# Patient Record
Sex: MALE | Race: OTHER | Employment: UNEMPLOYED | URBAN - METROPOLITAN AREA
[De-identification: names, ages, dates, MRNs, and addresses within clinical notes are randomized per-mention and may not be internally consistent; named-entity substitution may affect disease eponyms.]

---

## 2017-11-10 ENCOUNTER — HOSPITAL ENCOUNTER (EMERGENCY)
Age: 37
Discharge: HOME | End: 2017-11-11
Payer: SELF-PAY

## 2017-11-10 VITALS
RESPIRATION RATE: 18 BRPM | HEART RATE: 76 BPM | DIASTOLIC BLOOD PRESSURE: 86 MMHG | TEMPERATURE: 98.8 F | SYSTOLIC BLOOD PRESSURE: 130 MMHG | OXYGEN SATURATION: 97 %

## 2017-11-10 DIAGNOSIS — F29 PSYCHOSIS, UNSPECIFIED PSYCHOSIS TYPE (HCC): Primary | ICD-10-CM

## 2017-11-10 LAB
ALBUMIN SERPL-MCNC: 4.2 G/DL (ref 3.5–5)
ALBUMIN/GLOB SERPL: 0.9 {RATIO} (ref 1.2–3.5)
ALP SERPL-CCNC: 70 U/L (ref 50–136)
ALT SERPL-CCNC: 70 U/L (ref 12–65)
AMPHET UR QL SCN: POSITIVE
ANION GAP SERPL CALC-SCNC: 7 MMOL/L (ref 7–16)
APPEARANCE UR: NORMAL
AST SERPL-CCNC: 46 U/L (ref 15–37)
ATRIAL RATE: 71 BPM
BARBITURATES UR QL SCN: NEGATIVE
BASOPHILS # BLD: 0 K/UL (ref 0–0.2)
BASOPHILS NFR BLD: 1 % (ref 0–2)
BENZODIAZ UR QL: NEGATIVE
BILIRUB SERPL-MCNC: 0.7 MG/DL (ref 0.2–1.1)
BILIRUB UR QL: NEGATIVE
BUN SERPL-MCNC: 10 MG/DL (ref 6–23)
CALCIUM SERPL-MCNC: 9.2 MG/DL (ref 8.3–10.4)
CALCULATED P AXIS, ECG09: 50 DEGREES
CALCULATED R AXIS, ECG10: 78 DEGREES
CALCULATED T AXIS, ECG11: 63 DEGREES
CANNABINOIDS UR QL SCN: NEGATIVE
CHLORIDE SERPL-SCNC: 101 MMOL/L (ref 98–107)
CO2 SERPL-SCNC: 27 MMOL/L (ref 21–32)
COCAINE UR QL SCN: NEGATIVE
COLOR UR: YELLOW
CREAT SERPL-MCNC: 1.06 MG/DL (ref 0.8–1.5)
DIAGNOSIS, 93000: NORMAL
DIFFERENTIAL METHOD BLD: ABNORMAL
EOSINOPHIL # BLD: 0.3 K/UL (ref 0–0.8)
EOSINOPHIL NFR BLD: 4 % (ref 0.5–7.8)
ERYTHROCYTE [DISTWIDTH] IN BLOOD BY AUTOMATED COUNT: 14.5 % (ref 11.9–14.6)
ETHANOL SERPL-MCNC: <3 MG/DL
GLOBULIN SER CALC-MCNC: 4.6 G/DL (ref 2.3–3.5)
GLUCOSE SERPL-MCNC: 113 MG/DL (ref 65–100)
GLUCOSE UR STRIP.AUTO-MCNC: NEGATIVE MG/DL
HCT VFR BLD AUTO: 43.4 % (ref 41.1–50.3)
HGB BLD-MCNC: 15.7 G/DL (ref 13.6–17.2)
HGB UR QL STRIP: NEGATIVE
IMM GRANULOCYTES # BLD: 0 K/UL (ref 0–0.5)
IMM GRANULOCYTES NFR BLD AUTO: 0 % (ref 0–5)
KETONES UR QL STRIP.AUTO: NEGATIVE MG/DL
LEUKOCYTE ESTERASE UR QL STRIP.AUTO: NEGATIVE
LYMPHOCYTES # BLD: 1.1 K/UL (ref 0.5–4.6)
LYMPHOCYTES NFR BLD: 14 % (ref 13–44)
MAGNESIUM SERPL-MCNC: 1.9 MG/DL (ref 1.8–2.4)
MCH RBC QN AUTO: 31 PG (ref 26.1–32.9)
MCHC RBC AUTO-ENTMCNC: 36.2 G/DL (ref 31.4–35)
MCV RBC AUTO: 85.6 FL (ref 79.6–97.8)
METHADONE UR QL: NEGATIVE
MONOCYTES # BLD: 0.5 K/UL (ref 0.1–1.3)
MONOCYTES NFR BLD: 7 % (ref 4–12)
NEUTS SEG # BLD: 5.6 K/UL (ref 1.7–8.2)
NEUTS SEG NFR BLD: 74 % (ref 43–78)
NITRITE UR QL STRIP.AUTO: NEGATIVE
OPIATES UR QL: NEGATIVE
P-R INTERVAL, ECG05: 164 MS
PCP UR QL: NEGATIVE
PH UR STRIP: 5.5 [PH] (ref 5–9)
PLATELET # BLD AUTO: 217 K/UL (ref 150–450)
PMV BLD AUTO: 11.9 FL (ref 10.8–14.1)
POTASSIUM SERPL-SCNC: 4.1 MMOL/L (ref 3.5–5.1)
PROT SERPL-MCNC: 8.8 G/DL (ref 6.3–8.2)
PROT UR STRIP-MCNC: NEGATIVE MG/DL
Q-T INTERVAL, ECG07: 392 MS
QRS DURATION, ECG06: 92 MS
QTC CALCULATION (BEZET), ECG08: 425 MS
RBC # BLD AUTO: 5.07 M/UL (ref 4.23–5.67)
SODIUM SERPL-SCNC: 135 MMOL/L (ref 136–145)
SP GR UR REFRACTOMETRY: 1.02 (ref 1–1.02)
UROBILINOGEN UR QL STRIP.AUTO: 0.2 EU/DL (ref 0.2–1)
VENTRICULAR RATE, ECG03: 71 BPM
WBC # BLD AUTO: 7.6 K/UL (ref 4.3–11.1)

## 2017-11-10 PROCEDURE — 81003 URINALYSIS AUTO W/O SCOPE: CPT | Performed by: EMERGENCY MEDICINE

## 2017-11-10 PROCEDURE — 93005 ELECTROCARDIOGRAM TRACING: CPT

## 2017-11-10 PROCEDURE — 83735 ASSAY OF MAGNESIUM: CPT

## 2017-11-10 PROCEDURE — 74011250637 HC RX REV CODE- 250/637: Performed by: EMERGENCY MEDICINE

## 2017-11-10 PROCEDURE — 80053 COMPREHEN METABOLIC PANEL: CPT

## 2017-11-10 PROCEDURE — 80307 DRUG TEST PRSMV CHEM ANLYZR: CPT

## 2017-11-10 PROCEDURE — 99285 EMERGENCY DEPT VISIT HI MDM: CPT | Performed by: EMERGENCY MEDICINE

## 2017-11-10 PROCEDURE — 85025 COMPLETE CBC W/AUTO DIFF WBC: CPT

## 2017-11-10 PROCEDURE — 96374 THER/PROPH/DIAG INJ IV PUSH: CPT | Performed by: EMERGENCY MEDICINE

## 2017-11-10 PROCEDURE — 74011250636 HC RX REV CODE- 250/636

## 2017-11-10 RX ORDER — OLANZAPINE 5 MG/1
10 TABLET ORAL
Status: COMPLETED | OUTPATIENT
Start: 2017-11-10 | End: 2017-11-10

## 2017-11-10 RX ORDER — LORAZEPAM 2 MG/ML
0.5 INJECTION INTRAMUSCULAR
Status: COMPLETED | OUTPATIENT
Start: 2017-11-10 | End: 2017-11-10

## 2017-11-10 RX ORDER — OLANZAPINE 5 MG/1
10 TABLET, ORALLY DISINTEGRATING ORAL
Status: DISCONTINUED | OUTPATIENT
Start: 2017-11-10 | End: 2017-11-10 | Stop reason: RX

## 2017-11-10 RX ORDER — OLANZAPINE 5 MG/1
5 TABLET, ORALLY DISINTEGRATING ORAL 2 TIMES DAILY
Status: DISCONTINUED | OUTPATIENT
Start: 2017-11-10 | End: 2017-11-10 | Stop reason: RX

## 2017-11-10 RX ADMIN — OLANZAPINE 10 MG: 5 TABLET, FILM COATED ORAL at 11:45

## 2017-11-10 RX ADMIN — LORAZEPAM 0.5 MG: 2 INJECTION, SOLUTION INTRAMUSCULAR; INTRAVENOUS at 06:46

## 2017-11-10 NOTE — ED NOTES
PT received meal tray. Pt sleeping, respirations even and unlabored. No distress noted at this time. Sitter at bedside.

## 2017-11-10 NOTE — PROGRESS NOTES
Telepsych recommending inpatient admission. Call to [facility] and spoke with Christine Siddiqi. Patient added to waiting list which she states will most likely take a couple of weeks before a bed opens up. Bed requested and clinicals [facility] [address] and [facility].

## 2017-11-10 NOTE — ED NOTES
Patient provided with paper scrubs and requested to change. Patient provided with patient belongings bag at this time.

## 2017-11-10 NOTE — PROGRESS NOTES
Call from [place]na at [facility] who states they 'don't have an accepting doctor'. Call from Mike at [facility] who states they don't have any beds.

## 2017-11-10 NOTE — ED PROVIDER NOTES
HPI Comments: 64-year-old Japanese-speaking male presents to the ED via EMS with multiple complaints. EMS was dispatched for a psychiatric patient. Police were on scene and had been called to this patient on several occasions for psychiatric issues. Patient states that his heart was beating fast awoke him from sleep. Patient complained of headache to EMS and abdominal pain to persons on scene. He denies headache and abdominal pain now. Patient denies alcohol use today and does not use drugs. He does smoke cigarettes. Patient is a 40 y.o. male presenting with anxiety. The history is provided by the patient. Anxiety    This is a recurrent problem. The current episode started 3 to 5 hours ago. The problem has been resolved. The problem occurs constantly. The pain is associated with rest. The quality of the pain is described as tightness. The pain radiates to the upper back and right neck. The symptoms are aggravated by movement. Associated symptoms include headaches and palpitations. He has tried nothing for the symptoms. Risk factors include smoking/tobacco exposure. No past medical history on file. No past surgical history on file. No family history on file. Social History     Social History    Marital status: SINGLE     Spouse name: N/A    Number of children: N/A    Years of education: N/A     Occupational History    Not on file. Social History Main Topics    Smoking status: Not on file    Smokeless tobacco: Not on file    Alcohol use Not on file    Drug use: Not on file    Sexual activity: Not on file     Other Topics Concern    Not on file     Social History Narrative         ALLERGIES: Review of patient's allergies indicates no known allergies. Review of Systems   Constitutional: Negative. Negative for activity change. HENT: Negative. Eyes: Negative. Respiratory: Negative. Cardiovascular: Positive for palpitations. Gastrointestinal: Negative. Genitourinary: Negative. Musculoskeletal: Negative. Skin: Negative. Neurological: Positive for headaches. Psychiatric/Behavioral: Negative. All other systems reviewed and are negative. Vitals:    11/10/17 0600   BP: (!) 173/95   Pulse: 78   Resp: 20   Temp: 97 °F (36.1 °C)   SpO2: 98%            Physical Exam   Constitutional: He is oriented to person, place, and time. He appears well-developed and well-nourished. No distress. HENT:   Head: Normocephalic and atraumatic. Right Ear: External ear normal.   Left Ear: External ear normal.   Nose: Nose normal.   Mouth/Throat: Oropharynx is clear and moist. No oropharyngeal exudate. Eyes: Conjunctivae and EOM are normal. Pupils are equal, round, and reactive to light. Right eye exhibits no discharge. Left eye exhibits no discharge. No scleral icterus. Neck: Normal range of motion. Neck supple. No JVD present. No tracheal deviation present. Cardiovascular: Normal rate, regular rhythm and intact distal pulses. Pulmonary/Chest: Effort normal and breath sounds normal. No stridor. No respiratory distress. He has no wheezes. He exhibits no tenderness. Abdominal: Soft. Bowel sounds are normal. He exhibits no distension and no mass. There is no tenderness. Musculoskeletal: Normal range of motion. He exhibits no edema or tenderness. Cervical back: He exhibits pain. Back:    Neurological: He is alert and oriented to person, place, and time. No cranial nerve deficit. Skin: Skin is warm and dry. No rash noted. He is not diaphoretic. No erythema. No pallor. Psychiatric: He has a normal mood and affect. His behavior is normal. Thought content normal.   Nursing note and vitals reviewed. MDM  Number of Diagnoses or Management Options  Diagnosis management comments: Patient's pain complaint continued to change. We'll obtain laboratory data as a baseline and wait till the  gets here to clarify the complaints. Amount and/or Complexity of Data Reviewed  Clinical lab tests: ordered and reviewed  Tests in the medicine section of CPT®: ordered and reviewed      ED Course     ===================================================================  I have received Junita Rangel from Dr. Jason Farley    We discussed the patient's complaint, presentation, vitals and differential diagnosis. We discussed the patient's current status, and response to treatments  We reviewed critical lab values, allergies, and other factors. Issues or problems that are still being evaluated are: delusions    We rounded at the patient's bedside, the patient and family's questions and concerns were addressed. EXAM- well appearing, nontoxic male  Believes that his brothers are trying to kill him ? ?because of the demons  He has called the police and the [employer] [facility] but because his brothers speak [place] and he does not  They do not believe him    He does not report any homicidal or suicidal ideations. He has no desire to harm or kill himself. He wants to get help  Drug screen is positive for amphetamines. He wants to go home and get some rest.    We'll set him up for follow-up with mental health. He does not want to wait for a tele psychiatry consult. Discussed with case management.     Assessment/Plan- telepsych consult  reassess    Smiley Martinez MD; 11/10/2017 @7:35 AM  ===================================================================  D/w with Tele-neurology- pt with psychosis -- voices are telling him to kill himself  Hold patient pending bed  Start zyprexa 5mg BID  reconsult in a few days     Smiley Martinez MD; 11/10/2017 @10:52 AM===========================================        Procedures

## 2017-11-10 NOTE — ED NOTES
Pt moved to room 9. Pt ambulated with steady gait, cooperative with staff.   Pt denies needs at this time, sitter at bedside

## 2017-11-11 RX ORDER — OLANZAPINE 5 MG/1
5 TABLET ORAL 2 TIMES DAILY
Status: DISCONTINUED | OUTPATIENT
Start: 2017-11-11 | End: 2017-11-11 | Stop reason: HOSPADM

## 2017-11-11 RX ORDER — OLANZAPINE 5 MG/1
5 TABLET, ORALLY DISINTEGRATING ORAL 2 TIMES DAILY
Status: DISCONTINUED | OUTPATIENT
Start: 2017-11-11 | End: 2017-11-11

## 2017-11-11 NOTE — PROGRESS NOTES
Problem: Suicide/Homicide (Adult/Pediatric)  Goal: *STG: Remains safe in hospital  Outcome: Progressing Towards Goal  Patient without harm to self on shift  Goal: *STG: Seeks staff when feelings of self harm or harm towards others arise  Outcome: Progressing Towards Goal  Not verbalized any thoughts of self harm or harm towards others  Goal: *STG: Attends activities and groups  Variance: Resources not available  Comments: Not available on night-shift  Goal: *STG:  Verbalizes alternative ways of dealing with maladaptive feelings/behaviors  Outcome: Progressing Towards Goal  Patient has been resting most of shift. Will verbalize needs such as restroom. Goal: *STG/LTG: Complies with medication therapy  Outcome: Progressing Towards Goal  Variance: Resources not available  Comments: Medications not ordered on this shift. Goal: *STG/LTG:  No longer expresses self destructive or suicidal/homicidal thoughts  Outcome: Progressing Towards Goal  Has not verbalized any thoughts to providers on this shift. Goal: *LTG:  Identifies available community resources  Outcome: Progressing Towards Goal  Working with Social Work for placement for treatment. Goal: *LTG:  Develops proactive suicide prevention plan  Outcome: Progressing Towards Goal  Variance: Resources not available  Comments: Has not verbalized on shift.

## 2017-11-11 NOTE — ED NOTES
I have reviewed discharge instructions with the patient using . The patient verbalized understanding. Patient left ED via Discharge Method: ambulatory to Home with self. Opportunity for questions and clarification provided. Patient given 0 scripts.

## 2017-11-11 NOTE — ED NOTES
Pt sleeping in bed, resperations even and unlabored. No distress noted at this time. Sitter at bedside.

## 2017-11-11 NOTE — ED NOTES
Pt states he feels much better today after sleeping and is not hearing voices and is not wanting to hurt himself or others. Denies complaints at this time. Talked with Dr. Curtis Peter about getting zyprexa ordered.

## 2017-11-11 NOTE — PROGRESS NOTES
present for assessment by Dr. Gulshan Fierro and discharge instructions by Marylen Gordon, RN.           [facility]//Patient Relations  [place]  [address] / [place], [address]

## 2017-11-11 NOTE — ED NOTES
Assumed care of pt. After report from [facility]. Pt. Resting with no complaints at present.

## 2017-11-13 NOTE — ED NOTES
Discussed with Dr. Jet Figueroa    She saw the patient the following day. He was much improved. This appeared to be a case of methamphetamine intoxication    Patient did not express any ongoing homicidal or suicidal ideations. Hallucinations and resolved.   Patient was discharged home    Makayla Lim MD; 11/13/2017 @3:02 PM===========================================

## 2017-11-15 NOTE — PROGRESS NOTES
Email sent to [facility] Lebanese speaking ) and asked to call patient to arrange follow up with PCP and psychiatric referrals.